# Patient Record
Sex: FEMALE | Race: WHITE | NOT HISPANIC OR LATINO
[De-identification: names, ages, dates, MRNs, and addresses within clinical notes are randomized per-mention and may not be internally consistent; named-entity substitution may affect disease eponyms.]

---

## 2023-04-04 PROBLEM — Z00.00 ENCOUNTER FOR PREVENTIVE HEALTH EXAMINATION: Status: ACTIVE | Noted: 2023-04-04

## 2023-04-06 ENCOUNTER — APPOINTMENT (OUTPATIENT)
Dept: ORTHOPEDIC SURGERY | Facility: CLINIC | Age: 58
End: 2023-04-06
Payer: COMMERCIAL

## 2023-04-06 ENCOUNTER — RESULT REVIEW (OUTPATIENT)
Age: 58
End: 2023-04-06

## 2023-04-06 VITALS — HEIGHT: 60 IN | BODY MASS INDEX: 43.19 KG/M2 | WEIGHT: 220 LBS

## 2023-04-06 PROCEDURE — 99203 OFFICE O/P NEW LOW 30 MIN: CPT

## 2023-04-06 PROCEDURE — 73562 X-RAY EXAM OF KNEE 3: CPT | Mod: 50

## 2023-04-06 NOTE — IMAGING
[Bilateral] : knee bilaterally [de-identified] : Mild dystrophic gait\par \par Right knee\par No swelling\par Mild medial facet and joint line tenderness\par Passive range of motion 0 degrees to 120 degrees\par Ligaments are stable\par Quad strength 4+/5\par \par Left knee\par No swelling\par Mild medial facet and joint line tenderness\par Passive range of motion 0 degrees to 120 degrees\par Ligaments are stable\par Quad strength 4+/5\par \par Both legs\par Mild to moderate swelling\par Calves are soft and nontender\par Dorsalis pedis pulse 2+ bilaterally [FreeTextEntry9] : Reviewed and interpreted.  Right knee AP standing, lateral, sunrise views-mild to moderate degenerative changes with narrowing of the medial compartment on AP standing view, spurring patellofemoral joint\par \par Reviewed and interpreted.  Left knee AP standing, lateral, sunrise views-moderate degenerative changes with narrowing of the medial compartment on AP standing view, spurring patellofemoral joint

## 2023-04-06 NOTE — DISCUSSION/SUMMARY
[de-identified] : I offered the patient a cortisone injection right knee today but she declined\par I discussed repeat Euflexxa injections for her right knee and trying this on her left knee.  I gave her a pamphlet.  Risk benefits and alternatives were discussed.  She would like to do this\par Ice as needed\par Continue Advil as needed\par Because of increased swelling in her legs, she will venous Doppler both lower extremities today.  I did explain to her that if positive for DVT, this can be potentially life-threatening\par \par Impression:\par Mild to moderate osteoarthritis right knee\par Moderate osteoarthritis left knee\par

## 2023-04-06 NOTE — HISTORY OF PRESENT ILLNESS
[Gradual] : gradual [10] : 10 [Radiating] : radiating [Sharp] : sharp [Rest] : rest [Ice] : ice [Walking] : walking [de-identified] : Patient is a 57-year-old female with pain in both her knees over the past several weeks.  Right knee bothers her more than the left.  She has mild to moderate pain standing and walking.  Pain with stairs and movie sign.  Taking Advil as needed.  She has history of chronic lymphedema of both lower extremities.  She says her swelling may be a little worse recently.  She has not been wrapping her legs.  She did have previous Euflexxa injections right knee in 2012 with good improvement [] : Post Surgical Visit: no [FreeTextEntry7] : R Calf  [de-identified] : 10/25/2018 [de-identified] : Dr. Magallanes

## 2023-04-06 NOTE — REASON FOR VISIT
[FreeTextEntry2] : Patient complains of B Knee pain R Knee greater than L Knee. Pain behind R Knee radiating into R Calf Most painful when first waking up.Pain when walking and getting up from sitting. No injury or trauma,///////// X RAY B KNEES LAST SEEN  10/25/2018 [facility]

## 2023-04-09 ENCOUNTER — NON-APPOINTMENT (OUTPATIENT)
Age: 58
End: 2023-04-09

## 2023-05-02 ENCOUNTER — APPOINTMENT (OUTPATIENT)
Dept: ORTHOPEDIC SURGERY | Facility: CLINIC | Age: 58
End: 2023-05-02
Payer: COMMERCIAL

## 2023-05-02 VITALS — BODY MASS INDEX: 43.19 KG/M2 | WEIGHT: 220 LBS | HEIGHT: 60 IN

## 2023-05-02 PROCEDURE — 20611 DRAIN/INJ JOINT/BURSA W/US: CPT | Mod: 50

## 2023-05-02 RX ORDER — HYALURONATE SODIUM 20 MG/2 ML
20 SYRINGE (ML) INTRAARTICULAR
Refills: 0 | Status: COMPLETED | OUTPATIENT
Start: 2023-05-02

## 2023-05-02 RX ADMIN — Medication MG/2ML: at 00:00

## 2023-05-02 NOTE — IMAGING
[de-identified] : Mild dystrophic gait\par \par Right knee\par No swelling\par Mild medial facet and joint line tenderness\par Passive range of motion 0 degrees to 120 degrees\par \par Left knee\par No swelling\par Mild medial facet and joint line tenderness\par Passive range of motion 0 degrees to 120 degrees\par \par Both legs\par Mild to moderate swelling\par Calves are soft and nontender\par

## 2023-05-02 NOTE — PROCEDURE
[Large Joint Injection] : Large joint injection [Bilateral] : bilaterally of the [Knee] : knee [Pain] : pain [Alcohol] : alcohol [Betadine] : betadine [Ethyl Chloride sprayed topically] : ethyl chloride sprayed topically [Sterile technique used] : sterile technique used [Euflexxa(20mg)] : 20mg of Euflexxa [#1] : series #1 [] : Patient tolerated procedure well [Patient was advised to rest the joint(s) for ____ days] : patient was advised to rest the joint(s) for [unfilled] days [Risks, benefits, alternatives discussed / Verbal consent obtained] : the risks benefits, and alternatives have been discussed, and verbal consent was obtained [Altered anatomic landmarks d/t erosive arthritis] : altered anatomic landmarks d/t erosive arthritis [All ultrasound images have been permanently captured and stored accordingly in our picture archiving and communication system] : All ultrasound images have been permanently captured and stored accordingly in our picture archiving and communication system [FreeTextEntry3] : Do not submerge underwater for 24 hours

## 2023-05-02 NOTE — HISTORY OF PRESENT ILLNESS
[Gradual] : gradual [3] : 3 [Dull/Aching] : dull/aching [Intermittent] : intermittent [Rest] : rest [1] : 1 [Euflexxa] : Euflexxa [de-identified] : The patient is feeling better going to physical therapy.  She has mild pain standing and walking.  Mild to moderate pain after sitting and getting up.  Doing home exercises.  Taking Advil as needed [] : Post Surgical Visit: no [de-identified] : 04/06/23 [de-identified] : Dr. Magallanes

## 2023-05-02 NOTE — DISCUSSION/SUMMARY
[de-identified] : She would like to proceed with Euflexxa injections in both knees.  Risk benefits and the alternatives were discussed\par Ice as needed\par Continue Advil as needed\par \par \par Impression:\par Mild to moderate osteoarthritis right knee\par Moderate osteoarthritis left knee\par

## 2023-05-09 ENCOUNTER — APPOINTMENT (OUTPATIENT)
Dept: ORTHOPEDIC SURGERY | Facility: CLINIC | Age: 58
End: 2023-05-09
Payer: COMMERCIAL

## 2023-05-09 VITALS — BODY MASS INDEX: 43.19 KG/M2 | HEIGHT: 60 IN | WEIGHT: 220 LBS

## 2023-05-09 PROCEDURE — 20611 DRAIN/INJ JOINT/BURSA W/US: CPT | Mod: 50

## 2023-05-09 RX ORDER — HYALURONATE SODIUM 20 MG/2 ML
20 SYRINGE (ML) INTRAARTICULAR
Refills: 0 | Status: COMPLETED | OUTPATIENT
Start: 2023-05-09

## 2023-05-09 RX ADMIN — Medication MG/2ML: at 00:00

## 2023-05-09 NOTE — HISTORY OF PRESENT ILLNESS
[Gradual] : gradual [4] : 4 [Dull/Aching] : dull/aching [Household chores] : household chores [Leisure] : leisure [Rest] : rest [Stairs] : stairs [2] : 2 [Euflexxa] : Euflexxa [de-identified] : The patient feels better after the first Euflexxa injections in both knees.  She has mild pain standing and walking [] : Post Surgical Visit: no [de-identified] : 5/2/2023

## 2023-05-09 NOTE — PROCEDURE
[Large Joint Injection] : Large joint injection [Bilateral] : bilaterally of the [Knee] : knee [Pain] : pain [Alcohol] : alcohol [Betadine] : betadine [Ethyl Chloride sprayed topically] : ethyl chloride sprayed topically [Sterile technique used] : sterile technique used [Euflexxa(20mg)] : 20mg of Euflexxa [#2] : series #2 [] : Patient tolerated procedure well [Patient was advised to rest the joint(s) for ____ days] : patient was advised to rest the joint(s) for [unfilled] days [Risks, benefits, alternatives discussed / Verbal consent obtained] : the risks benefits, and alternatives have been discussed, and verbal consent was obtained [Altered anatomic landmarks d/t erosive arthritis] : altered anatomic landmarks d/t erosive arthritis [All ultrasound images have been permanently captured and stored accordingly in our picture archiving and communication system] : All ultrasound images have been permanently captured and stored accordingly in our picture archiving and communication system [FreeTextEntry3] : Do not submerge underwater for 24 hours

## 2023-05-09 NOTE — DISCUSSION/SUMMARY
[de-identified] : Continue PT at [facility] in [place]\par Ice as needed\par Continue Advil as needed\par \par \par Impression:\par Mild to moderate osteoarthritis right knee\par Moderate osteoarthritis left knee\par

## 2023-05-09 NOTE — IMAGING
[de-identified] : Mild dystrophic gait\par \par Right knee\par No swelling\par Mild medial facet and joint line tenderness\par Passive range of motion 0 degrees to 120 degrees\par \par Left knee\par No swelling\par Mild medial facet and joint line tenderness\par Passive range of motion 0 degrees to 120 degrees\par \par Both legs\par Mild swelling\par Calves are soft and nontender\par

## 2023-05-16 ENCOUNTER — APPOINTMENT (OUTPATIENT)
Dept: ORTHOPEDIC SURGERY | Facility: CLINIC | Age: 58
End: 2023-05-16
Payer: COMMERCIAL

## 2023-05-16 VITALS — BODY MASS INDEX: 43.19 KG/M2 | HEIGHT: 60 IN | WEIGHT: 220 LBS

## 2023-05-16 PROCEDURE — 99213 OFFICE O/P EST LOW 20 MIN: CPT | Mod: 25

## 2023-05-16 PROCEDURE — 20611 DRAIN/INJ JOINT/BURSA W/US: CPT | Mod: 50

## 2023-05-16 RX ORDER — HYALURONATE SODIUM 20 MG/2 ML
20 SYRINGE (ML) INTRAARTICULAR
Refills: 0 | Status: COMPLETED | OUTPATIENT
Start: 2023-05-16

## 2023-05-16 RX ADMIN — Medication MG/2ML: at 00:00

## 2023-05-16 NOTE — IMAGING
[de-identified] : Mild dystrophic gait\par \par Right knee\par No swelling\par Mild medial facet and joint line tenderness\par Passive range of motion 0 degrees to 120 degrees\par \par Left knee\par No swelling\par Mild medial facet and joint line tenderness\par Passive range of motion 0 degrees to 120 degrees\par \par Both legs\par Mild swelling\par Calves are soft and nontender\par

## 2023-05-16 NOTE — HISTORY OF PRESENT ILLNESS
[Gradual] : gradual [4] : 4 [Dull/Aching] : dull/aching [Intermittent] : intermittent [Leisure] : leisure [Rest] : rest [Stairs] : stairs [3] : 3 [Euflexxa] : Euflexxa [de-identified] : The patient feels a little better after the second Euflexxa injections in both knees.  She has mild pain standing and walking.  Mild pain after sitting and getting up. [] : Post Surgical Visit: no [de-identified] : 5/9/2023

## 2023-05-16 NOTE — DISCUSSION/SUMMARY
[de-identified] : Plan going forward was outlined and discussed with the patient\par She will continue PT at [facility] in [place]\par She will try to lose weight\par I discussed possible cortisone injections\par I discussed possible repeat Euflexxa injections\par Ice as needed\par Continue Advil as needed\par \par \par Impression:\par Mild to moderate osteoarthritis right knee\par Moderate osteoarthritis left knee\par

## 2023-05-16 NOTE — PROCEDURE
[Large Joint Injection] : Large joint injection [Bilateral] : bilaterally of the [Knee] : knee [Pain] : pain [Alcohol] : alcohol [Betadine] : betadine [Ethyl Chloride sprayed topically] : ethyl chloride sprayed topically [Sterile technique used] : sterile technique used [Euflexxa(20mg)] : 20mg of Euflexxa [#3] : series #3 [] : Patient tolerated procedure well [Patient was advised to rest the joint(s) for ____ days] : patient was advised to rest the joint(s) for [unfilled] days [Risks, benefits, alternatives discussed / Verbal consent obtained] : the risks benefits, and alternatives have been discussed, and verbal consent was obtained [Altered anatomic landmarks d/t erosive arthritis] : altered anatomic landmarks d/t erosive arthritis [All ultrasound images have been permanently captured and stored accordingly in our picture archiving and communication system] : All ultrasound images have been permanently captured and stored accordingly in our picture archiving and communication system [FreeTextEntry3] : Do not submerge underwater for 24 hours

## 2023-06-28 ENCOUNTER — APPOINTMENT (OUTPATIENT)
Dept: ORTHOPEDIC SURGERY | Facility: CLINIC | Age: 58
End: 2023-06-28
Payer: COMMERCIAL

## 2023-06-28 VITALS — HEIGHT: 60 IN | BODY MASS INDEX: 43.19 KG/M2 | WEIGHT: 220 LBS

## 2023-06-28 DIAGNOSIS — I89.0 LYMPHEDEMA, NOT ELSEWHERE CLASSIFIED: ICD-10-CM

## 2023-06-28 PROCEDURE — 99213 OFFICE O/P EST LOW 20 MIN: CPT

## 2023-06-28 NOTE — IMAGING
[de-identified] : Mild dystrophic gait\par \par Right knee\par No swelling\par Mild medial facet and joint line tenderness\par Passive range of motion 0 degrees to 120 degrees\par Ligaments are stable\par Quad strength 4+/5\par \par Left knee\par No swelling\par Mild medial facet and joint line tenderness\par Passive range of motion 0 degrees to 120 degrees\par Ligaments are stable\par Quad strength 4+/5\par \par Both legs\par Mild swelling\par Calves are soft and nontender\par Dorsalis pedis pulse 2+ bilaterally\par

## 2023-06-28 NOTE — DISCUSSION/SUMMARY
[de-identified] : Plan going forward was outlined and discussed with the patient\par She will continue PT at [facility] in [place]\par She will try to lose weight\par I discussed possible cortisone injections\par I discussed possible repeat Euflexxa injections\par Ice as needed\par Continue Advil as needed\par \par \par Impression:\par Mild to moderate osteoarthritis right knee\par Moderate osteoarthritis left knee\par

## 2023-06-28 NOTE — HISTORY OF PRESENT ILLNESS
[Gradual] : gradual [5] : 5 [1] : 2 [Dull/Aching] : dull/aching [Intermittent] : intermittent [Leisure] : leisure [Rest] : rest [Standing] : standing [Stairs] : stairs [de-identified] : The patient has been going to physical therapy twice a week.  Doing home exercises.  She has mild pain in both knees standing and walking.  Pain after sitting getting up.  She does feel improvement after previous Euflexxa injections in both knees [] : Post Surgical Visit: no [de-identified] : 5/16/2023 [de-identified] : Dr. Magallanes

## 2023-07-19 ENCOUNTER — TRANSCRIPTION ENCOUNTER (OUTPATIENT)
Age: 58
End: 2023-07-19

## 2023-11-01 ENCOUNTER — APPOINTMENT (OUTPATIENT)
Dept: ORTHOPEDIC SURGERY | Facility: CLINIC | Age: 58
End: 2023-11-01
Payer: COMMERCIAL

## 2023-11-01 VITALS — BODY MASS INDEX: 43.19 KG/M2 | HEIGHT: 60 IN | WEIGHT: 220 LBS

## 2023-11-01 DIAGNOSIS — M17.0 BILATERAL PRIMARY OSTEOARTHRITIS OF KNEE: ICD-10-CM

## 2023-11-01 DIAGNOSIS — M22.41 CHONDROMALACIA PATELLAE, RIGHT KNEE: ICD-10-CM

## 2023-11-01 DIAGNOSIS — M22.42 CHONDROMALACIA PATELLAE, LEFT KNEE: ICD-10-CM

## 2023-11-01 PROCEDURE — 99213 OFFICE O/P EST LOW 20 MIN: CPT

## 2023-11-01 RX ORDER — VERAPAMIL HYDROCHLORIDE 80 MG/1
TABLET ORAL
Refills: 0 | Status: ACTIVE | COMMUNITY

## 2023-11-01 RX ORDER — METOPROLOL TARTRATE 75 MG/1
TABLET, FILM COATED ORAL
Refills: 0 | Status: ACTIVE | COMMUNITY

## 2024-01-24 ENCOUNTER — APPOINTMENT (OUTPATIENT)
Dept: ORTHOPEDIC SURGERY | Facility: CLINIC | Age: 59
End: 2024-01-24

## 2025-04-30 ENCOUNTER — RX CHANGE (OUTPATIENT)
Age: 60
End: 2025-04-30

## 2025-05-05 ENCOUNTER — RX RENEWAL (OUTPATIENT)
Age: 60
End: 2025-05-05

## 2025-06-09 ENCOUNTER — RX RENEWAL (OUTPATIENT)
Age: 60
End: 2025-06-09